# Patient Record
Sex: FEMALE | Race: WHITE | NOT HISPANIC OR LATINO | URBAN - METROPOLITAN AREA
[De-identification: names, ages, dates, MRNs, and addresses within clinical notes are randomized per-mention and may not be internally consistent; named-entity substitution may affect disease eponyms.]

---

## 2017-03-16 ENCOUNTER — OUTPATIENT (OUTPATIENT)
Dept: OUTPATIENT SERVICES | Facility: HOSPITAL | Age: 62
LOS: 1 days | End: 2017-03-16
Payer: COMMERCIAL

## 2017-03-16 ENCOUNTER — APPOINTMENT (OUTPATIENT)
Dept: MAMMOGRAPHY | Facility: CLINIC | Age: 62
End: 2017-03-16

## 2017-03-16 ENCOUNTER — APPOINTMENT (OUTPATIENT)
Dept: ULTRASOUND IMAGING | Facility: CLINIC | Age: 62
End: 2017-03-16

## 2017-03-16 DIAGNOSIS — Z00.8 ENCOUNTER FOR OTHER GENERAL EXAMINATION: ICD-10-CM

## 2017-04-21 PROCEDURE — 76641 ULTRASOUND BREAST COMPLETE: CPT

## 2017-04-21 PROCEDURE — 77063 BREAST TOMOSYNTHESIS BI: CPT

## 2017-04-21 PROCEDURE — 77067 SCR MAMMO BI INCL CAD: CPT

## 2018-03-28 ENCOUNTER — APPOINTMENT (OUTPATIENT)
Dept: ULTRASOUND IMAGING | Facility: CLINIC | Age: 63
End: 2018-03-28
Payer: COMMERCIAL

## 2018-03-28 ENCOUNTER — APPOINTMENT (OUTPATIENT)
Dept: MAMMOGRAPHY | Facility: CLINIC | Age: 63
End: 2018-03-28
Payer: COMMERCIAL

## 2018-03-28 ENCOUNTER — OUTPATIENT (OUTPATIENT)
Dept: OUTPATIENT SERVICES | Facility: HOSPITAL | Age: 63
LOS: 1 days | End: 2018-03-28
Payer: COMMERCIAL

## 2018-03-28 DIAGNOSIS — Z00.8 ENCOUNTER FOR OTHER GENERAL EXAMINATION: ICD-10-CM

## 2018-03-28 PROCEDURE — 77063 BREAST TOMOSYNTHESIS BI: CPT | Mod: 26

## 2018-03-28 PROCEDURE — 76641 ULTRASOUND BREAST COMPLETE: CPT | Mod: 26,50

## 2018-03-28 PROCEDURE — 77067 SCR MAMMO BI INCL CAD: CPT | Mod: 26

## 2018-03-28 PROCEDURE — 77067 SCR MAMMO BI INCL CAD: CPT

## 2018-03-28 PROCEDURE — 76641 ULTRASOUND BREAST COMPLETE: CPT

## 2018-03-28 PROCEDURE — 77063 BREAST TOMOSYNTHESIS BI: CPT

## 2019-04-05 ENCOUNTER — APPOINTMENT (OUTPATIENT)
Dept: ULTRASOUND IMAGING | Facility: CLINIC | Age: 64
End: 2019-04-05
Payer: COMMERCIAL

## 2019-04-05 ENCOUNTER — APPOINTMENT (OUTPATIENT)
Dept: MAMMOGRAPHY | Facility: CLINIC | Age: 64
End: 2019-04-05
Payer: COMMERCIAL

## 2019-04-05 ENCOUNTER — OUTPATIENT (OUTPATIENT)
Dept: OUTPATIENT SERVICES | Facility: HOSPITAL | Age: 64
LOS: 1 days | End: 2019-04-05
Payer: COMMERCIAL

## 2019-04-05 DIAGNOSIS — Z00.8 ENCOUNTER FOR OTHER GENERAL EXAMINATION: ICD-10-CM

## 2019-04-05 PROCEDURE — 77067 SCR MAMMO BI INCL CAD: CPT

## 2019-04-05 PROCEDURE — 77063 BREAST TOMOSYNTHESIS BI: CPT | Mod: 26

## 2019-04-05 PROCEDURE — 76641 ULTRASOUND BREAST COMPLETE: CPT | Mod: 26,50

## 2019-04-05 PROCEDURE — 76641 ULTRASOUND BREAST COMPLETE: CPT

## 2019-04-05 PROCEDURE — 77063 BREAST TOMOSYNTHESIS BI: CPT

## 2019-04-05 PROCEDURE — 77067 SCR MAMMO BI INCL CAD: CPT | Mod: 26

## 2020-06-03 ENCOUNTER — OUTPATIENT (OUTPATIENT)
Dept: OUTPATIENT SERVICES | Facility: HOSPITAL | Age: 65
LOS: 1 days | End: 2020-06-03
Payer: COMMERCIAL

## 2020-06-03 ENCOUNTER — APPOINTMENT (OUTPATIENT)
Dept: ULTRASOUND IMAGING | Facility: CLINIC | Age: 65
End: 2020-06-03
Payer: COMMERCIAL

## 2020-06-03 ENCOUNTER — APPOINTMENT (OUTPATIENT)
Dept: MAMMOGRAPHY | Facility: CLINIC | Age: 65
End: 2020-06-03
Payer: COMMERCIAL

## 2020-06-03 DIAGNOSIS — Z00.8 ENCOUNTER FOR OTHER GENERAL EXAMINATION: ICD-10-CM

## 2020-06-03 PROCEDURE — 77067 SCR MAMMO BI INCL CAD: CPT | Mod: 26

## 2020-06-03 PROCEDURE — 76641 ULTRASOUND BREAST COMPLETE: CPT

## 2020-06-03 PROCEDURE — 76641 ULTRASOUND BREAST COMPLETE: CPT | Mod: 26,50

## 2020-06-03 PROCEDURE — 77063 BREAST TOMOSYNTHESIS BI: CPT | Mod: 26

## 2020-06-03 PROCEDURE — 77067 SCR MAMMO BI INCL CAD: CPT

## 2020-06-03 PROCEDURE — 77063 BREAST TOMOSYNTHESIS BI: CPT

## 2021-07-15 ENCOUNTER — OUTPATIENT (OUTPATIENT)
Dept: OUTPATIENT SERVICES | Facility: HOSPITAL | Age: 66
LOS: 1 days | End: 2021-07-15
Payer: MEDICARE

## 2021-07-15 ENCOUNTER — APPOINTMENT (OUTPATIENT)
Dept: ULTRASOUND IMAGING | Facility: CLINIC | Age: 66
End: 2021-07-15
Payer: MEDICARE

## 2021-07-15 ENCOUNTER — APPOINTMENT (OUTPATIENT)
Dept: MAMMOGRAPHY | Facility: CLINIC | Age: 66
End: 2021-07-15
Payer: MEDICARE

## 2021-07-15 DIAGNOSIS — Z12.31 ENCOUNTER FOR SCREENING MAMMOGRAM FOR MALIGNANT NEOPLASM OF BREAST: ICD-10-CM

## 2021-07-15 DIAGNOSIS — R92.2 INCONCLUSIVE MAMMOGRAM: ICD-10-CM

## 2021-07-15 PROCEDURE — 77067 SCR MAMMO BI INCL CAD: CPT | Mod: 26

## 2021-07-15 PROCEDURE — 77063 BREAST TOMOSYNTHESIS BI: CPT

## 2021-07-15 PROCEDURE — 77067 SCR MAMMO BI INCL CAD: CPT

## 2021-07-15 PROCEDURE — 77063 BREAST TOMOSYNTHESIS BI: CPT | Mod: 26

## 2021-07-15 PROCEDURE — 76641 ULTRASOUND BREAST COMPLETE: CPT | Mod: 26,50

## 2021-07-15 PROCEDURE — 76641 ULTRASOUND BREAST COMPLETE: CPT

## 2022-02-28 ENCOUNTER — APPOINTMENT (OUTPATIENT)
Dept: UROLOGY | Facility: CLINIC | Age: 67
End: 2022-02-28
Payer: MEDICARE

## 2022-02-28 VITALS
BODY MASS INDEX: 27.6 KG/M2 | HEART RATE: 82 BPM | HEIGHT: 62 IN | OXYGEN SATURATION: 98 % | SYSTOLIC BLOOD PRESSURE: 179 MMHG | WEIGHT: 150 LBS | DIASTOLIC BLOOD PRESSURE: 85 MMHG

## 2022-02-28 DIAGNOSIS — N39.0 URINARY TRACT INFECTION, SITE NOT SPECIFIED: ICD-10-CM

## 2022-02-28 PROCEDURE — 99204 OFFICE O/P NEW MOD 45 MIN: CPT

## 2022-02-28 RX ORDER — CEFDINIR 300 MG/1
300 CAPSULE ORAL
Qty: 14 | Refills: 0 | Status: ACTIVE | COMMUNITY
Start: 2022-02-28 | End: 1900-01-01

## 2022-02-28 NOTE — PHYSICAL EXAM
[General Appearance - Well Developed] : well developed [General Appearance - Well Nourished] : well nourished [Normal Appearance] : normal appearance [Well Groomed] : well groomed [General Appearance - In No Acute Distress] : no acute distress [] : no respiratory distress [Respiration, Rhythm And Depth] : normal respiratory rhythm and effort [Exaggerated Use Of Accessory Muscles For Inspiration] : no accessory muscle use [Normal Station and Gait] : the gait and station were normal for the patient's age [Oriented To Time, Place, And Person] : oriented to person, place, and time [Affect] : the affect was normal [Mood] : the mood was normal [Not Anxious] : not anxious

## 2022-02-28 NOTE — HISTORY OF PRESENT ILLNESS
[Urinary Urgency] : urinary urgency [FreeTextEntry1] : 65 yo F for initial consultation\par PMH: DM2, anxiety and depression, HLD, chronic dry eye, \par PSH: two Cesarian sections\par allergy to sulfa\par never a smoker\par \par 1.5 years a go an episode of UTI\par a month ago started feeling cramping in the lower abdomen and a sensation of irritated bladder\par no fever, no flank pain, no hematuria\par was seen by her gynecologist 2 weeks ago\par started on Macrobid with improvement\par and now the symptoms are recurring\par \par discussed the option of recurrent UTI's \par will send a new urine culture and order a renal ultrasound\par if both are normal and symptoms persist will need a cystoscopy to confirm no renal lesions before treatment for OAB. discussed the options with the patient and she understands.\par \par plan:\par - sent urine for culture\par - will start abx\par - ordered ultrasound\par - will update results over the phone: if all normal and symptoms persist will schedule cystoscopy, if it is a recurrent UTI, will confirm sterile urine after and discuss the prophylactic treatment after

## 2022-02-28 NOTE — ASSESSMENT
[FreeTextEntry1] : \par plan:\par - sent urine for culture\par - will start abx\par - ordered ultrasound\par - will update results over the phone: if all normal and symptoms persist will schedule cystoscopy, if it is a recurrent UTI, will confirm sterile urine after and discuss the prophylactic treatment after

## 2022-03-03 ENCOUNTER — OUTPATIENT (OUTPATIENT)
Dept: OUTPATIENT SERVICES | Facility: HOSPITAL | Age: 67
LOS: 1 days | End: 2022-03-03
Payer: MEDICARE

## 2022-03-03 ENCOUNTER — APPOINTMENT (OUTPATIENT)
Dept: ULTRASOUND IMAGING | Facility: CLINIC | Age: 67
End: 2022-03-03
Payer: MEDICARE

## 2022-03-03 DIAGNOSIS — N39.0 URINARY TRACT INFECTION, SITE NOT SPECIFIED: ICD-10-CM

## 2022-03-03 PROCEDURE — 76770 US EXAM ABDO BACK WALL COMP: CPT

## 2022-03-03 PROCEDURE — 76770 US EXAM ABDO BACK WALL COMP: CPT | Mod: 26

## 2022-03-04 LAB — BACTERIA UR CULT: NORMAL

## 2022-07-18 ENCOUNTER — RESULT REVIEW (OUTPATIENT)
Age: 67
End: 2022-07-18

## 2022-07-18 ENCOUNTER — OUTPATIENT (OUTPATIENT)
Dept: OUTPATIENT SERVICES | Facility: HOSPITAL | Age: 67
LOS: 1 days | End: 2022-07-18
Payer: MEDICARE

## 2022-07-18 ENCOUNTER — APPOINTMENT (OUTPATIENT)
Dept: MAMMOGRAPHY | Facility: CLINIC | Age: 67
End: 2022-07-18

## 2022-07-18 ENCOUNTER — APPOINTMENT (OUTPATIENT)
Dept: ULTRASOUND IMAGING | Facility: CLINIC | Age: 67
End: 2022-07-18

## 2022-07-18 DIAGNOSIS — Z00.8 ENCOUNTER FOR OTHER GENERAL EXAMINATION: ICD-10-CM

## 2022-07-18 PROCEDURE — 77067 SCR MAMMO BI INCL CAD: CPT

## 2022-07-18 PROCEDURE — 76641 ULTRASOUND BREAST COMPLETE: CPT | Mod: 26,50

## 2022-07-18 PROCEDURE — 76641 ULTRASOUND BREAST COMPLETE: CPT

## 2022-07-18 PROCEDURE — 77063 BREAST TOMOSYNTHESIS BI: CPT | Mod: 26

## 2022-07-18 PROCEDURE — 77063 BREAST TOMOSYNTHESIS BI: CPT

## 2022-07-18 PROCEDURE — 77067 SCR MAMMO BI INCL CAD: CPT | Mod: 26

## 2022-09-26 DIAGNOSIS — Z79.890 HORMONE REPLACEMENT THERAPY: ICD-10-CM

## 2022-09-27 ENCOUNTER — RX RENEWAL (OUTPATIENT)
Age: 67
End: 2022-09-27

## 2022-10-26 ENCOUNTER — RESULT CHARGE (OUTPATIENT)
Age: 67
End: 2022-10-26

## 2022-10-26 ENCOUNTER — APPOINTMENT (OUTPATIENT)
Dept: OBGYN | Facility: CLINIC | Age: 67
End: 2022-10-26

## 2022-10-26 VITALS — DIASTOLIC BLOOD PRESSURE: 78 MMHG | WEIGHT: 150 LBS | BODY MASS INDEX: 27.44 KG/M2 | SYSTOLIC BLOOD PRESSURE: 153 MMHG

## 2022-10-26 DIAGNOSIS — R92.2 INCONCLUSIVE MAMMOGRAM: ICD-10-CM

## 2022-10-26 DIAGNOSIS — R82.998 OTHER ABNORMAL FINDINGS IN URINE: ICD-10-CM

## 2022-10-26 DIAGNOSIS — N95.2 POSTMENOPAUSAL ATROPHIC VAGINITIS: ICD-10-CM

## 2022-10-26 LAB
BILIRUB UR QL STRIP: NORMAL
CLARITY UR: CLEAR
COLLECTION METHOD: NORMAL
DATE COLLECTED: NORMAL
GLUCOSE UR-MCNC: NORMAL
HCG UR QL: 0.2 EU/DL
HEMOCCULT SP1 STL QL: NEGATIVE
HEMOGLOBIN: 11.1
HGB UR QL STRIP.AUTO: NORMAL
KETONES UR-MCNC: NORMAL
LEUKOCYTE ESTERASE UR QL STRIP: NORMAL
NITRITE UR QL STRIP: NORMAL
PH UR STRIP: 7
PROT UR STRIP-MCNC: NORMAL
QUALITY CONTROL: YES
SP GR UR STRIP: 1.02

## 2022-10-26 PROCEDURE — 82270 OCCULT BLOOD FECES: CPT

## 2022-10-26 PROCEDURE — 81003 URINALYSIS AUTO W/O SCOPE: CPT | Mod: QW

## 2022-10-26 PROCEDURE — 85018 HEMOGLOBIN: CPT | Mod: QW

## 2022-10-26 PROCEDURE — G0101: CPT

## 2022-10-27 LAB
APPEARANCE: CLEAR
BILIRUBIN URINE: NEGATIVE
BLOOD URINE: NEGATIVE
COLOR: NORMAL
GLUCOSE QUALITATIVE U: NEGATIVE
KETONES URINE: NEGATIVE
LEUKOCYTE ESTERASE URINE: NEGATIVE
NITRITE URINE: NEGATIVE
PH URINE: 7
PROTEIN URINE: NEGATIVE
SPECIFIC GRAVITY URINE: 1.02
UROBILINOGEN URINE: NORMAL

## 2022-10-28 NOTE — PLAN
[FreeTextEntry1] : \par \par Patient to follow up in 1 year for annual GYN exam\par Mammogram and bilateral breast US due: 7/23 Rx given \par colonoscopy: now \par Bone density due: 1 year \par Pap ordered\par Hemoccult ordered \par All questions answered, patient agreeable with plan.\par \par \par Written by Candy FERGUSON, acting as a scribe for Dr. Tai. This note accurately reflects the work and decisions made by me.\par

## 2022-10-28 NOTE — PHYSICAL EXAM
[Chaperone Present] : A chaperone was present in the examining room during all aspects of the physical examination [Appropriately responsive] : appropriately responsive [Alert] : alert [No Lymphadenopathy] : no lymphadenopathy [Soft] : soft [Non-tender] : non-tender [Oriented x3] : oriented x3 [Examination Of The Breasts] : a normal appearance [No Discharge] : no discharge [No Masses] : no breast masses were palpable [Labia Majora] : normal [Labia Minora] : normal [Atrophy] : atrophy [Normal] : normal [Uterine Adnexae] : normal [FreeTextEntry1] : Candy BLACKBURN-DAVID chaperoned during entire physical exam [Occult Blood Positive] : was negative for occult blood analysis

## 2022-10-28 NOTE — HISTORY OF PRESENT ILLNESS
[TextBox_4] : Patient is a 66y/o female here today for annual visit.\par Patient had recent dx of HTN followed by cardiologist at [facility] cardiology, she started medication 3 days ago. \par She denies any GYN complaints at this time.

## 2022-10-30 LAB — BACTERIA UR CULT: NORMAL

## 2022-11-04 LAB — CYTOLOGY CVX/VAG DOC THIN PREP: NORMAL

## 2023-07-21 ENCOUNTER — RESULT REVIEW (OUTPATIENT)
Age: 68
End: 2023-07-21

## 2023-07-21 ENCOUNTER — OUTPATIENT (OUTPATIENT)
Dept: OUTPATIENT SERVICES | Facility: HOSPITAL | Age: 68
LOS: 1 days | End: 2023-07-21
Payer: MEDICARE

## 2023-07-21 ENCOUNTER — APPOINTMENT (OUTPATIENT)
Dept: ULTRASOUND IMAGING | Facility: CLINIC | Age: 68
End: 2023-07-21
Payer: MEDICARE

## 2023-07-21 ENCOUNTER — APPOINTMENT (OUTPATIENT)
Dept: MAMMOGRAPHY | Facility: CLINIC | Age: 68
End: 2023-07-21
Payer: MEDICARE

## 2023-07-21 DIAGNOSIS — Z00.00 ENCOUNTER FOR GENERAL ADULT MEDICAL EXAMINATION WITHOUT ABNORMAL FINDINGS: ICD-10-CM

## 2023-07-21 PROCEDURE — 77067 SCR MAMMO BI INCL CAD: CPT | Mod: 26

## 2023-07-21 PROCEDURE — 76641 ULTRASOUND BREAST COMPLETE: CPT

## 2023-07-21 PROCEDURE — 77063 BREAST TOMOSYNTHESIS BI: CPT | Mod: 26

## 2023-07-21 PROCEDURE — 76641 ULTRASOUND BREAST COMPLETE: CPT | Mod: 26,50,GY

## 2023-07-21 PROCEDURE — 77067 SCR MAMMO BI INCL CAD: CPT

## 2023-07-21 PROCEDURE — 77063 BREAST TOMOSYNTHESIS BI: CPT

## 2023-11-01 ENCOUNTER — APPOINTMENT (OUTPATIENT)
Dept: OBGYN | Facility: CLINIC | Age: 68
End: 2023-11-01
Payer: MEDICARE

## 2023-11-01 ENCOUNTER — RESULT CHARGE (OUTPATIENT)
Age: 68
End: 2023-11-01

## 2023-11-01 VITALS
WEIGHT: 155 LBS | HEART RATE: 78 BPM | SYSTOLIC BLOOD PRESSURE: 146 MMHG | BODY MASS INDEX: 28.52 KG/M2 | DIASTOLIC BLOOD PRESSURE: 84 MMHG | HEIGHT: 62 IN

## 2023-11-01 DIAGNOSIS — Z00.00 ENCOUNTER FOR GENERAL ADULT MEDICAL EXAMINATION W/OUT ABNORMAL FINDINGS: ICD-10-CM

## 2023-11-01 DIAGNOSIS — Z13.820 ENCOUNTER FOR SCREENING FOR OSTEOPOROSIS: ICD-10-CM

## 2023-11-01 LAB
BILIRUB UR QL STRIP: NORMAL
CLARITY UR: CLEAR
COLLECTION METHOD: NORMAL
GLUCOSE UR-MCNC: NORMAL
HCG UR QL: 0 EU/DL
HEMOGLOBIN: 13.6
HGB UR QL STRIP.AUTO: NORMAL
KETONES UR-MCNC: NORMAL
LEUKOCYTE ESTERASE UR QL STRIP: NORMAL
NITRITE UR QL STRIP: NORMAL
PH UR STRIP: 6
PROT UR STRIP-MCNC: NORMAL
SP GR UR STRIP: 1

## 2023-11-01 PROCEDURE — G0101: CPT

## 2023-11-03 ENCOUNTER — NON-APPOINTMENT (OUTPATIENT)
Age: 68
End: 2023-11-03

## 2023-11-03 LAB — BACTERIA UR CULT: NORMAL

## 2023-11-06 ENCOUNTER — NON-APPOINTMENT (OUTPATIENT)
Age: 68
End: 2023-11-06

## 2023-11-06 LAB — CYTOLOGY CVX/VAG DOC THIN PREP: NORMAL

## 2024-01-04 ENCOUNTER — RX RENEWAL (OUTPATIENT)
Age: 69
End: 2024-01-04

## 2024-07-24 ENCOUNTER — APPOINTMENT (OUTPATIENT)
Dept: MAMMOGRAPHY | Facility: CLINIC | Age: 69
End: 2024-07-24

## 2024-07-24 ENCOUNTER — APPOINTMENT (OUTPATIENT)
Dept: ULTRASOUND IMAGING | Facility: CLINIC | Age: 69
End: 2024-07-24

## 2024-07-24 ENCOUNTER — RESULT REVIEW (OUTPATIENT)
Age: 69
End: 2024-07-24

## 2024-07-24 ENCOUNTER — NON-APPOINTMENT (OUTPATIENT)
Age: 69
End: 2024-07-24

## 2024-07-24 PROCEDURE — 76641 ULTRASOUND BREAST COMPLETE: CPT | Mod: 26,50,GY

## 2024-07-24 PROCEDURE — 77063 BREAST TOMOSYNTHESIS BI: CPT | Mod: 26

## 2024-07-24 PROCEDURE — 77067 SCR MAMMO BI INCL CAD: CPT | Mod: 26

## 2024-10-30 PROBLEM — Z12.4 CERVICAL CANCER SCREENING: Status: ACTIVE | Noted: 2024-10-30

## 2024-10-30 PROBLEM — Z01.419 ENCOUNTER FOR ANNUAL ROUTINE GYNECOLOGICAL EXAMINATION: Status: ACTIVE | Noted: 2024-10-30

## 2024-10-30 PROBLEM — Z12.11 COLON CANCER SCREENING: Status: ACTIVE | Noted: 2024-10-30

## 2024-11-06 ENCOUNTER — APPOINTMENT (OUTPATIENT)
Dept: OBGYN | Facility: CLINIC | Age: 69
End: 2024-11-06

## 2024-11-06 DIAGNOSIS — Z01.419 ENCOUNTER FOR GYNECOLOGICAL EXAMINATION (GENERAL) (ROUTINE) W/OUT ABNORMAL FINDINGS: ICD-10-CM

## 2024-11-06 DIAGNOSIS — Z12.11 ENCOUNTER FOR SCREENING FOR MALIGNANT NEOPLASM OF COLON: ICD-10-CM

## 2024-11-06 DIAGNOSIS — Z12.4 ENCOUNTER FOR SCREENING FOR MALIGNANT NEOPLASM OF CERVIX: ICD-10-CM

## 2024-12-05 ENCOUNTER — APPOINTMENT (OUTPATIENT)
Dept: OBGYN | Facility: CLINIC | Age: 69
End: 2024-12-05
Payer: MEDICARE

## 2024-12-05 VITALS
DIASTOLIC BLOOD PRESSURE: 80 MMHG | BODY MASS INDEX: 28.17 KG/M2 | WEIGHT: 154 LBS | SYSTOLIC BLOOD PRESSURE: 132 MMHG | HEART RATE: 85 BPM

## 2024-12-05 DIAGNOSIS — Z12.39 ENCOUNTER FOR OTHER SCREENING FOR MALIGNANT NEOPLASM OF BREAST: ICD-10-CM

## 2024-12-05 DIAGNOSIS — Z13.820 ENCOUNTER FOR SCREENING FOR OSTEOPOROSIS: ICD-10-CM

## 2024-12-05 DIAGNOSIS — Z12.4 ENCOUNTER FOR SCREENING FOR MALIGNANT NEOPLASM OF CERVIX: ICD-10-CM

## 2024-12-05 DIAGNOSIS — Z12.11 ENCOUNTER FOR SCREENING FOR MALIGNANT NEOPLASM OF COLON: ICD-10-CM

## 2024-12-05 DIAGNOSIS — N95.2 POSTMENOPAUSAL ATROPHIC VAGINITIS: ICD-10-CM

## 2024-12-05 DIAGNOSIS — Z01.419 ENCOUNTER FOR GYNECOLOGICAL EXAMINATION (GENERAL) (ROUTINE) W/OUT ABNORMAL FINDINGS: ICD-10-CM

## 2024-12-05 LAB
BILIRUB UR QL STRIP: NEGATIVE
CLARITY UR: CLEAR
COLLECTION METHOD: NORMAL
GLUCOSE UR-MCNC: NEGATIVE
HCG UR QL: 0 EU/DL
HEMOGLOBIN: 14
HGB UR QL STRIP.AUTO: NEGATIVE
KETONES UR-MCNC: NEGATIVE
LEUKOCYTE ESTERASE UR QL STRIP: NORMAL
NITRITE UR QL STRIP: NEGATIVE
PH UR STRIP: 7
PROT UR STRIP-MCNC: NEGATIVE
SP GR UR STRIP: 1

## 2024-12-05 PROCEDURE — 85018 HEMOGLOBIN: CPT | Mod: QW

## 2024-12-05 PROCEDURE — G0101: CPT

## 2024-12-05 PROCEDURE — 82270 OCCULT BLOOD FECES: CPT

## 2024-12-05 PROCEDURE — 81003 URINALYSIS AUTO W/O SCOPE: CPT | Mod: QW

## 2025-07-24 ENCOUNTER — RESULT REVIEW (OUTPATIENT)
Age: 70
End: 2025-07-24

## 2025-07-24 ENCOUNTER — OUTPATIENT (OUTPATIENT)
Dept: OUTPATIENT SERVICES | Facility: HOSPITAL | Age: 70
LOS: 1 days | End: 2025-07-24
Payer: MEDICARE

## 2025-07-24 ENCOUNTER — APPOINTMENT (OUTPATIENT)
Dept: MAMMOGRAPHY | Facility: CLINIC | Age: 70
End: 2025-07-24
Payer: MEDICARE

## 2025-07-24 ENCOUNTER — APPOINTMENT (OUTPATIENT)
Dept: ULTRASOUND IMAGING | Facility: CLINIC | Age: 70
End: 2025-07-24
Payer: MEDICARE

## 2025-07-24 DIAGNOSIS — Z12.39 ENCOUNTER FOR OTHER SCREENING FOR MALIGNANT NEOPLASM OF BREAST: ICD-10-CM

## 2025-07-24 DIAGNOSIS — R92.30 DENSE BREASTS, UNSPECIFIED: ICD-10-CM

## 2025-07-24 PROCEDURE — 77067 SCR MAMMO BI INCL CAD: CPT | Mod: 26

## 2025-07-24 PROCEDURE — 77063 BREAST TOMOSYNTHESIS BI: CPT | Mod: 26

## 2025-07-24 PROCEDURE — 77063 BREAST TOMOSYNTHESIS BI: CPT

## 2025-07-24 PROCEDURE — 76641 ULTRASOUND BREAST COMPLETE: CPT

## 2025-07-24 PROCEDURE — 76641 ULTRASOUND BREAST COMPLETE: CPT | Mod: 26,50,GZ

## 2025-07-24 PROCEDURE — 77067 SCR MAMMO BI INCL CAD: CPT

## 2025-07-25 ENCOUNTER — NON-APPOINTMENT (OUTPATIENT)
Age: 70
End: 2025-07-25

## 2025-07-29 ENCOUNTER — NON-APPOINTMENT (OUTPATIENT)
Age: 70
End: 2025-07-29

## 2025-09-09 ENCOUNTER — APPOINTMENT (OUTPATIENT)
Dept: HEPATOLOGY | Facility: CLINIC | Age: 70
End: 2025-09-09
Payer: MEDICARE

## 2025-09-09 DIAGNOSIS — R74.02 ELEVATION OF LEVELS OF LIVER TRANSAMINASE LEVELS: ICD-10-CM

## 2025-09-09 DIAGNOSIS — R74.01 ELEVATION OF LEVELS OF LIVER TRANSAMINASE LEVELS: ICD-10-CM

## 2025-09-09 PROCEDURE — 76981 USE PARENCHYMA: CPT | Mod: 26

## 2025-09-12 PROBLEM — R74.01 ELEVATED LEVELS OF TRANSAMINASE & LACTIC ACID DEHYDROGENASE: Status: ACTIVE | Noted: 2025-09-12
